# Patient Record
Sex: FEMALE | Race: BLACK OR AFRICAN AMERICAN | Employment: FULL TIME | URBAN - METROPOLITAN AREA
[De-identification: names, ages, dates, MRNs, and addresses within clinical notes are randomized per-mention and may not be internally consistent; named-entity substitution may affect disease eponyms.]

---

## 2020-12-26 ENCOUNTER — HOSPITAL ENCOUNTER (EMERGENCY)
Age: 35
Discharge: HOME | End: 2020-12-26
Attending: EMERGENCY MEDICINE

## 2020-12-26 VITALS
SYSTOLIC BLOOD PRESSURE: 125 MMHG | HEART RATE: 105 BPM | RESPIRATION RATE: 18 BRPM | DIASTOLIC BLOOD PRESSURE: 88 MMHG | OXYGEN SATURATION: 97 % | TEMPERATURE: 98.7 F

## 2020-12-26 DIAGNOSIS — H65.21 RIGHT CHRONIC SEROUS OTITIS MEDIA: Primary | ICD-10-CM

## 2020-12-26 PROCEDURE — 99282 EMERGENCY DEPT VISIT SF MDM: CPT

## 2020-12-26 RX ORDER — AMOXICILLIN 250 MG/1
250 CAPSULE ORAL 3 TIMES DAILY
COMMUNITY

## 2020-12-27 NOTE — DISCHARGE INSTRUCTIONS
Augmentin as directed. OTC sudafed 12 hour twice daily for 5 days. Follow up with ENT in 7-10 days if not improving.

## 2020-12-27 NOTE — ED TRIAGE NOTES
Patient was seen at [facility] couple weeks ago and she took antibiotic and it still there. Patient ishere because she think the infection is still there.  Patient was masked on arrival.

## 2020-12-27 NOTE — ED PROVIDER NOTES
The history is provided by the patient. Ear Pain   This is a recurrent problem. The current episode started more than 1 week ago. The problem occurs constantly. The problem has not changed since onset. There has been no fever. The pain is moderate. Associated symptoms include hearing loss. Pertinent negatives include no ear discharge and no rhinorrhea. History reviewed. No pertinent past medical history. History reviewed. No pertinent surgical history. History reviewed. No pertinent family history.     Social History     Socioeconomic History    Marital status: SINGLE     Spouse name: Not on file    Number of children: Not on file    Years of education: Not on file    Highest education level: Not on file   Occupational History    Not on file   Social Needs    Financial resource strain: Not on file    Food insecurity     Worry: Not on file     Inability: Not on file    Transportation needs     Medical: Not on file     Non-medical: Not on file   Tobacco Use    Smoking status: Never Smoker    Smokeless tobacco: Never Used   Substance and Sexual Activity    Alcohol use: Not on file    Drug use: Not on file    Sexual activity: Not on file   Lifestyle    Physical activity     Days per week: Not on file     Minutes per session: Not on file    Stress: Not on file   Relationships    Social connections     Talks on phone: Not on file     Gets together: Not on file     Attends Mormonism service: Not on file     Active member of club or organization: Not on file     Attends meetings of clubs or organizations: Not on file     Relationship status: Not on file    Intimate partner violence     Fear of current or ex partner: Not on file     Emotionally abused: Not on file     Physically abused: Not on file     Forced sexual activity: Not on file   Other Topics Concern    Not on file   Social History Narrative    Not on file         ALLERGIES: Aspirin and Codeine    Review of Systems Constitutional: Negative for fever. HENT: Positive for ear pain, hearing loss and tinnitus. Negative for ear discharge and rhinorrhea. Vitals:    12/26/20 2219   BP: 125/88   Pulse: (!) 105   Resp: 18   Temp: 98.7 °F (37.1 °C)   SpO2: 97%            Physical Exam  Vitals signs and nursing note reviewed. Constitutional:       General: She is not in acute distress. HENT:      Right Ear: Ear canal and external ear normal. A middle ear effusion is present. Tympanic membrane is bulging. Tympanic membrane is not erythematous. Left Ear: Ear canal and external ear normal. A middle ear effusion is present. Tympanic membrane is not erythematous or bulging. Neurological:      Mental Status: She is alert. MDM  Number of Diagnoses or Management Options  Diagnosis management comments: Serous otitis media non responsive to decongestants and antihistamines. Did respond to antibiotics at first in September. Augmentin and refer to ENT.          Procedures

## 2020-12-27 NOTE — ED NOTES
I have reviewed discharge instructions with the patient. The patient verbalized understanding. Patient left ED via Discharge Method: ambulatory to Home with self). Opportunity for questions and clarification provided. Patient given 1 scripts. To continue your aftercare when you leave the hospital, you may receive an automated call from our care team to check in on how you are doing. This is a free service and part of our promise to provide the best care and service to meet your aftercare needs.  If you have questions, or wish to unsubscribe from this service please call [phone]. Thank you for Choosing our [facility] Emergency Department.

## 2021-02-05 ENCOUNTER — HOSPITAL ENCOUNTER (EMERGENCY)
Age: 36
Discharge: HOME | End: 2021-02-05
Attending: EMERGENCY MEDICINE

## 2021-02-05 ENCOUNTER — APPOINTMENT (OUTPATIENT)
Dept: GENERAL RADIOLOGY | Age: 36
End: 2021-02-05
Attending: EMERGENCY MEDICINE

## 2021-02-05 VITALS
HEIGHT: 67 IN | DIASTOLIC BLOOD PRESSURE: 100 MMHG | BODY MASS INDEX: 32.96 KG/M2 | OXYGEN SATURATION: 98 % | SYSTOLIC BLOOD PRESSURE: 147 MMHG | WEIGHT: 210 LBS | TEMPERATURE: 98.1 F | RESPIRATION RATE: 19 BRPM | HEART RATE: 106 BPM

## 2021-02-05 DIAGNOSIS — M25.511 ACUTE PAIN OF RIGHT SHOULDER: ICD-10-CM
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
## 2021-02-05 LAB — HCG UR QL: NEGATIVE

## 2021-02-05 PROCEDURE — 73030 X-RAY EXAM OF SHOULDER: CPT

## 2021-02-05 PROCEDURE — 72040 X-RAY EXAM NECK SPINE 2-3 VW: CPT

## 2021-02-05 PROCEDURE — 99283 EMERGENCY DEPT VISIT LOW MDM: CPT

## 2021-02-05 PROCEDURE — 81025 URINE PREGNANCY TEST: CPT

## 2021-02-05 PROCEDURE — 74011250637 HC RX REV CODE- 250/637: Performed by: EMERGENCY MEDICINE

## 2021-02-05 RX ORDER — METHOCARBAMOL 500 MG/1
750 TABLET, FILM COATED ORAL
Status: COMPLETED | OUTPATIENT
Start: 2021-02-05 | End: 2021-02-05

## 2021-02-05 RX ORDER — METHOCARBAMOL 750 MG/1
750 TABLET, FILM COATED ORAL 3 TIMES DAILY
Qty: 15 TAB | Refills: 0 | OUTPATIENT
Start: 2021-02-05 | End: 2021-02-06

## 2021-02-05 RX ADMIN — METHOCARBAMOL TABLETS 750 MG: 500 TABLET, COATED ORAL at 23:06

## 2021-02-06 ENCOUNTER — HOSPITAL ENCOUNTER (EMERGENCY)
Age: 36
Discharge: HOME | End: 2021-02-06
Attending: EMERGENCY MEDICINE

## 2021-02-06 ENCOUNTER — APPOINTMENT (OUTPATIENT)
Dept: CT IMAGING | Age: 36
End: 2021-02-06
Attending: EMERGENCY MEDICINE

## 2021-02-06 VITALS
OXYGEN SATURATION: 98 % | HEIGHT: 67 IN | TEMPERATURE: 98.9 F | WEIGHT: 210 LBS | SYSTOLIC BLOOD PRESSURE: 159 MMHG | BODY MASS INDEX: 32.96 KG/M2 | RESPIRATION RATE: 18 BRPM | HEART RATE: 109 BPM | DIASTOLIC BLOOD PRESSURE: 84 MMHG

## 2021-02-06 DIAGNOSIS — R51.9 ACUTE NONINTRACTABLE HEADACHE, UNSPECIFIED HEADACHE TYPE: ICD-10-CM

## 2021-02-06 DIAGNOSIS — M79.18 MUSCULOSKELETAL PAIN: Primary | ICD-10-CM

## 2021-02-06 LAB
ALBUMIN SERPL-MCNC: 4.3 G/DL (ref 3.5–5)
ALBUMIN/GLOB SERPL: 1.1 {RATIO} (ref 1.2–3.5)
ALP SERPL-CCNC: 51 U/L (ref 50–136)
ALT SERPL-CCNC: 36 U/L (ref 12–65)
ANION GAP SERPL CALC-SCNC: 7 MMOL/L (ref 7–16)
APAP SERPL-MCNC: <2 UG/ML (ref 10–30)
AST SERPL-CCNC: 21 U/L (ref 15–37)
BILIRUB SERPL-MCNC: 0.2 MG/DL (ref 0.2–1.1)
BUN SERPL-MCNC: 9 MG/DL (ref 6–23)
CALCIUM SERPL-MCNC: 9.4 MG/DL (ref 8.3–10.4)
CHLORIDE SERPL-SCNC: 106 MMOL/L (ref 98–107)
CO2 SERPL-SCNC: 26 MMOL/L (ref 21–32)
CREAT SERPL-MCNC: 0.69 MG/DL (ref 0.6–1)
ERYTHROCYTE [DISTWIDTH] IN BLOOD BY AUTOMATED COUNT: 12 % (ref 11.9–14.6)
GLOBULIN SER CALC-MCNC: 3.9 G/DL (ref 2.3–3.5)
GLUCOSE SERPL-MCNC: 111 MG/DL (ref 65–100)
HCT VFR BLD AUTO: 37.5 % (ref 35.8–46.3)
HGB BLD-MCNC: 12.5 G/DL (ref 11.7–15.4)
MCH RBC QN AUTO: 31.6 PG (ref 26.1–32.9)
MCHC RBC AUTO-ENTMCNC: 33.3 G/DL (ref 31.4–35)
MCV RBC AUTO: 94.9 FL (ref 79.6–97.8)
NRBC # BLD: 0 K/UL (ref 0–0.2)
PLATELET # BLD AUTO: 462 K/UL (ref 150–450)
PMV BLD AUTO: 8.5 FL (ref 9.4–12.3)
POTASSIUM SERPL-SCNC: 3.4 MMOL/L (ref 3.5–5.1)
PROT SERPL-MCNC: 8.2 G/DL (ref 6.3–8.2)
RBC # BLD AUTO: 3.95 M/UL (ref 4.05–5.2)
SODIUM SERPL-SCNC: 139 MMOL/L (ref 136–145)
WBC # BLD AUTO: 9.6 K/UL (ref 4.3–11.1)

## 2021-02-06 PROCEDURE — 80143 DRUG ASSAY ACETAMINOPHEN: CPT

## 2021-02-06 PROCEDURE — 96372 THER/PROPH/DIAG INJ SC/IM: CPT

## 2021-02-06 PROCEDURE — 74011250636 HC RX REV CODE- 250/636: Performed by: EMERGENCY MEDICINE

## 2021-02-06 PROCEDURE — 99284 EMERGENCY DEPT VISIT MOD MDM: CPT

## 2021-02-06 PROCEDURE — 80053 COMPREHEN METABOLIC PANEL: CPT

## 2021-02-06 PROCEDURE — 85027 COMPLETE CBC AUTOMATED: CPT

## 2021-02-06 PROCEDURE — 70450 CT HEAD/BRAIN W/O DYE: CPT

## 2021-02-06 RX ORDER — KETOROLAC TROMETHAMINE 30 MG/ML
30 INJECTION, SOLUTION INTRAMUSCULAR; INTRAVENOUS ONCE
Status: COMPLETED | OUTPATIENT
Start: 2021-02-06 | End: 2021-02-06

## 2021-02-06 RX ORDER — CYCLOBENZAPRINE HCL 10 MG
10 TABLET ORAL
Qty: 15 TAB | Refills: 0 | Status: SHIPPED | OUTPATIENT
Start: 2021-02-06

## 2021-02-06 RX ORDER — DICLOFENAC SODIUM 50 MG/1
50 TABLET, DELAYED RELEASE ORAL 2 TIMES DAILY
Qty: 14 TAB | Refills: 0 | Status: SHIPPED | OUTPATIENT
Start: 2021-02-06 | End: 2021-02-13

## 2021-02-06 RX ADMIN — KETOROLAC TROMETHAMINE 30 MG: 30 INJECTION, SOLUTION INTRAMUSCULAR at 21:50

## 2021-02-06 NOTE — DISCHARGE INSTRUCTIONS
Take Robaxin as prescribed. Schedule close follow-up with your PCP. Return to ED if symptoms worsen or progress in any way. Rest, ICE, elevate.
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
## 2021-02-06 NOTE — ED NOTES
I have reviewed discharge instructions with the patient. The patient verbalized understanding. Patient left ED via Discharge Method: ambulatory to Home. Opportunity for questions and clarification provided. Patient given 1 scripts. Sling applied to right arm. To continue your aftercare when you leave the hospital, you may receive an automated call from our care team to check in on how you are doing. This is a free service and part of our promise to provide the best care and service to meet your aftercare needs.  If you have questions, or wish to unsubscribe from this service please call [phone]. Thank you for Choosing our [address] [place] [address] Emergency Department.
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
## 2021-02-06 NOTE — Clinical Note
[facility] EMERGENCY DEPT 
[address] [facility] 
[phone] Work/School Note Date: 2/6/2021 To Whom It May concern: 
 
Renetta Avila was seen and treated today in the emergency room by the following provider(s): 
Attending Provider: Charles Dugan MD. Renetta Avila is excused from work/school on 02/06/21 and 02/07/21. She is medically clear to return to work/school on 2/8/2021. Sincerely, Noreen Marin MD
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
## 2021-02-07 NOTE — ED NOTES
I have reviewed discharge instructions with the patient. The patient verbalized understanding. Patient left ED via Discharge Method: ambulatory to Home with self. Opportunity for questions and clarification provided. Patient given 2 scripts. To continue your aftercare when you leave the hospital, you may receive an automated call from our care team to check in on how you are doing. This is a free service and part of our promise to provide the best care and service to meet your aftercare needs.  If you have questions, or wish to unsubscribe from this service please call [phone]. Thank you for Choosing our [facility] Emergency Department.
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
## 2021-07-08 ENCOUNTER — HOSPITAL ENCOUNTER (OUTPATIENT)
Dept: CT IMAGING | Age: 36
Discharge: HOME | End: 2021-07-08
Attending: PHYSICIAN ASSISTANT

## 2021-07-08 DIAGNOSIS — H90.A31 MIXED CONDUCTIVE AND SENSORINEURAL HEARING LOSS OF RIGHT EAR WITH RESTRICTED HEARING OF LEFT EAR: ICD-10-CM

## 2021-07-08 DIAGNOSIS — H93.A1 PULSATILE TINNITUS, RIGHT EAR: ICD-10-CM

## 2021-07-08 PROCEDURE — 74011000636 HC RX REV CODE- 636: Performed by: PHYSICIAN ASSISTANT

## 2021-07-08 PROCEDURE — 70480 CT ORBIT/EAR/FOSSA W/O DYE: CPT

## 2021-07-08 PROCEDURE — 70491 CT SOFT TISSUE NECK W/DYE: CPT

## 2021-07-08 PROCEDURE — 74011000258 HC RX REV CODE- 258: Performed by: PHYSICIAN ASSISTANT

## 2021-07-08 RX ORDER — SODIUM CHLORIDE 0.9 % (FLUSH) 0.9 %
10 SYRINGE (ML) INJECTION
Status: COMPLETED | OUTPATIENT
Start: 2021-07-08 | End: 2021-07-08

## 2021-07-08 RX ADMIN — SODIUM CHLORIDE 100 ML: 900 INJECTION, SOLUTION INTRAVENOUS at 08:56

## 2021-07-08 RX ADMIN — IOPAMIDOL 80 ML: 755 INJECTION, SOLUTION INTRAVENOUS at 08:56

## 2021-07-08 RX ADMIN — Medication 10 ML: at 08:56

## 2022-09-24 ENCOUNTER — HOSPITAL ENCOUNTER (EMERGENCY)
Age: 37
Discharge: HOME | End: 2022-09-24
Attending: EMERGENCY MEDICINE

## 2022-09-24 ENCOUNTER — HOSPITAL ENCOUNTER (EMERGENCY)
Dept: CT IMAGING | Age: 37
Discharge: HOME | End: 2022-09-27

## 2022-09-24 VITALS
BODY MASS INDEX: 24.64 KG/M2 | WEIGHT: 157 LBS | HEART RATE: 92 BPM | HEIGHT: 67 IN | SYSTOLIC BLOOD PRESSURE: 131 MMHG | OXYGEN SATURATION: 99 % | RESPIRATION RATE: 16 BRPM | TEMPERATURE: 99.6 F | DIASTOLIC BLOOD PRESSURE: 101 MMHG

## 2022-09-24 DIAGNOSIS — R13.10 ODYNOPHAGIA: Primary | ICD-10-CM

## 2022-09-24 LAB
ALBUMIN SERPL-MCNC: 3.9 G/DL (ref 3.5–5)
ALBUMIN/GLOB SERPL: 1 {RATIO} (ref 1.2–3.5)
ALP SERPL-CCNC: 49 U/L (ref 50–136)
ALT SERPL-CCNC: 17 U/L (ref 12–65)
ANION GAP SERPL CALC-SCNC: 3 MMOL/L (ref 4–13)
AST SERPL-CCNC: 7 U/L (ref 15–37)
BASOPHILS # BLD: 0.1 K/UL (ref 0–0.2)
BASOPHILS NFR BLD: 1 % (ref 0–2)
BILIRUB SERPL-MCNC: 0.4 MG/DL (ref 0.2–1.1)
BUN SERPL-MCNC: 10 MG/DL (ref 6–23)
CALCIUM SERPL-MCNC: 9 MG/DL (ref 8.3–10.4)
CHLORIDE SERPL-SCNC: 112 MMOL/L (ref 101–110)
CO2 SERPL-SCNC: 24 MMOL/L (ref 21–32)
CREAT SERPL-MCNC: 0.7 MG/DL (ref 0.6–1)
CRP SERPL-MCNC: 0.5 MG/DL (ref 0–0.9)
DIFFERENTIAL METHOD BLD: ABNORMAL
EOSINOPHIL # BLD: 0.1 K/UL (ref 0–0.8)
EOSINOPHIL NFR BLD: 1 % (ref 0.5–7.8)
ERYTHROCYTE [DISTWIDTH] IN BLOOD BY AUTOMATED COUNT: 12 % (ref 11.9–14.6)
ERYTHROCYTE [SEDIMENTATION RATE] IN BLOOD: 19 MM/HR (ref 0–20)
GLOBULIN SER CALC-MCNC: 3.8 G/DL (ref 2.3–3.5)
GLUCOSE SERPL-MCNC: 83 MG/DL (ref 65–100)
HCT VFR BLD AUTO: 34.6 % (ref 35.8–46.3)
HGB BLD-MCNC: 11.5 G/DL (ref 11.7–15.4)
IMM GRANULOCYTES # BLD AUTO: 0 K/UL (ref 0–0.5)
IMM GRANULOCYTES NFR BLD AUTO: 0 % (ref 0–5)
LYMPHOCYTES # BLD: 2.1 K/UL (ref 0.5–4.6)
LYMPHOCYTES NFR BLD: 27 % (ref 13–44)
MCH RBC QN AUTO: 30 PG (ref 26.1–32.9)
MCHC RBC AUTO-ENTMCNC: 33.2 G/DL (ref 31.4–35)
MCV RBC AUTO: 90.3 FL (ref 79.6–97.8)
MONOCYTES # BLD: 0.5 K/UL (ref 0.1–1.3)
MONOCYTES NFR BLD: 6 % (ref 4–12)
NEUTS SEG # BLD: 5.2 K/UL (ref 1.7–8.2)
NEUTS SEG NFR BLD: 65 % (ref 43–78)
NRBC # BLD: 0 K/UL (ref 0–0.2)
PLATELET # BLD AUTO: 394 K/UL (ref 150–450)
PMV BLD AUTO: 9.2 FL (ref 9.4–12.3)
POTASSIUM SERPL-SCNC: 3.7 MMOL/L (ref 3.5–5.1)
PROT SERPL-MCNC: 7.7 G/DL (ref 6.3–8.2)
RBC # BLD AUTO: 3.83 M/UL (ref 4.05–5.2)
SODIUM SERPL-SCNC: 139 MMOL/L (ref 136–145)
WBC # BLD AUTO: 7.9 K/UL (ref 4.3–11.1)

## 2022-09-24 PROCEDURE — 6360000004 HC RX CONTRAST MEDICATION

## 2022-09-24 PROCEDURE — 85025 COMPLETE CBC W/AUTO DIFF WBC: CPT

## 2022-09-24 PROCEDURE — 86140 C-REACTIVE PROTEIN: CPT

## 2022-09-24 PROCEDURE — 96374 THER/PROPH/DIAG INJ IV PUSH: CPT

## 2022-09-24 PROCEDURE — 70498 CT ANGIOGRAPHY NECK: CPT

## 2022-09-24 PROCEDURE — 80053 COMPREHEN METABOLIC PANEL: CPT

## 2022-09-24 PROCEDURE — 99284 EMERGENCY DEPT VISIT MOD MDM: CPT

## 2022-09-24 PROCEDURE — 85652 RBC SED RATE AUTOMATED: CPT

## 2022-09-24 PROCEDURE — 6360000002 HC RX W HCPCS: Performed by: STUDENT IN AN ORGANIZED HEALTH CARE EDUCATION/TRAINING PROGRAM

## 2022-09-24 RX ORDER — KETOROLAC TROMETHAMINE 30 MG/ML
15 INJECTION, SOLUTION INTRAMUSCULAR; INTRAVENOUS ONCE
Status: COMPLETED | OUTPATIENT
Start: 2022-09-24 | End: 2022-09-24

## 2022-09-24 RX ORDER — NAPROXEN 500 MG/1
500 TABLET ORAL 2 TIMES DAILY
Qty: 60 TABLET | Refills: 0 | Status: SHIPPED | OUTPATIENT
Start: 2022-09-24 | End: 2022-10-24

## 2022-09-24 RX ADMIN — KETOROLAC TROMETHAMINE 15 MG: 30 INJECTION, SOLUTION INTRAMUSCULAR at 16:39

## 2022-09-24 RX ADMIN — IOPAMIDOL 100 ML: 755 INJECTION, SOLUTION INTRAVENOUS at 15:09

## 2022-09-24 ASSESSMENT — PAIN - FUNCTIONAL ASSESSMENT: PAIN_FUNCTIONAL_ASSESSMENT: NONE - DENIES PAIN

## 2022-09-24 ASSESSMENT — ENCOUNTER SYMPTOMS
CHEST TIGHTNESS: 0
SHORTNESS OF BREATH: 0
TROUBLE SWALLOWING: 1
BLOOD IN STOOL: 0
PHOTOPHOBIA: 0
VOICE CHANGE: 0
CONSTIPATION: 0
VOMITING: 0
EYE PAIN: 0
SORE THROAT: 0
WHEEZING: 0
DIARRHEA: 0
EYE REDNESS: 0
SINUS PRESSURE: 0
COLOR CHANGE: 0
ABDOMINAL PAIN: 0
APNEA: 0
NAUSEA: 0
EYE DISCHARGE: 0
COUGH: 0
RHINORRHEA: 0

## 2022-09-24 NOTE — ED PROVIDER NOTES
Emergency Department Provider Note                   PCP:                Unknown Unknown               Age: 40 y.o. Sex: female       ICD-10-CM    1. Odynophagia  R13.10           DISPOSITION Decision To Discharge 09/24/2022 05:30:45 PM        MDM  Number of Diagnoses or Management Options  Odynophagia  Diagnosis management comments: In summary this is a 54-year-old female patient presenting with symptoms of right neck pain and pain with swallowing. Based on my evaluation I feel she is low risk for more serious cause of diagnosis such as vessel occlusion, tumor, inability to swallow, airway compromise. Reasoning behind decision making process is that she is grossly well-appearing on exam.  She is handling her secretions well. She is able to swallow and she is able to pass swallow test with water without difficulty. She has even and nonlabored respirations and shows no sign of struggle to breathe. Her airway is intact. There is no signs of peritonsillar abscess. Uvula is midline. Reviewed chart from prior visit at [facility] 2 days ago show stable patient with a CT soft tissue neck with the following impression:     1. Dominant right internal jugular vein with slight asymmetric resultant bulging of the right neck. 2. Otherwise, no evidence of suspicious deep neck space mass. The visualized aerodigestive tract is symmetric and unremarkable. Today patient underwent repeat blood work which was largely unremarkable. She also had a CTA of her neck which was largely unremarkable. Given the fact that the patient is able to swallow without difficulty and has no other concerning signs on exam and has stable vitals, she will be discharged with GI follow-up. I have given a new GI referral so that hopefully she will be able to get in sooner. I have counseled her on warning signs return immediately for including but not limited to inability to swallow, difficulty handling secretions, difficulty breathing. Patient has verbalized understanding and was discharged in stable condition. She agreed with the plan. Patient history, ROS, physical exam, labs, radiological workup and all pertinent findings were discussed with attending Rachna Stroud MD.           Amount and/or Complexity of Data Reviewed  Clinical lab tests: ordered and reviewed  Tests in the radiology section of CPT®: ordered and reviewed  Discuss the patient with other providers: yes  Independent visualization of images, tracings, or specimens: yes    Risk of Complications, Morbidity, and/or Mortality  Presenting problems: moderate  Diagnostic procedures: moderate  Management options: moderate    Patient Progress  Patient progress: stable       ED Course as of 09/24/22 1734   Sat Sep 24, 2022   1330 Patient with swelling and pain to the right side of her neck that is worsening. She was at [facility] on Thursday for the same and they found an enlarged jugular vein in her neck with a possible mass. She was referred to GI. There is palpable tenderness and swelling to the right jugular vein. Patient evaluated initially in triage. Rapid Medical Evaluation was conducted and necessary orders have been placed. I have performed a medical screening exam.  Care will now be transferred to the provider in the back of the emergency department. STACIE Nichols PA 1:31 PM   [SM]   6207 5:33 PM  Ct unremarkable. Patient reports toradol helped greatly.  She was very thankful and agreeable of plan to discharge with GI followup and naproxen to go home on.  [NR]      ED Course User Index  [NR] Dahiana Peterson  [SM] STACIE Louise        Orders Placed This Encounter   Procedures    CTA NECK W WO CONTRAST    CMP    Sedimentation Rate    CBC with Auto Differential    C-Reactive Protein    Saline lock IV        Medications   ketorolac (TORADOL) injection 15 mg (15 mg IntraVENous Given 9/24/22 1639)       New Prescriptions    NAPROXEN (NAPROSYN) 500 MG TABLET    Take 1 tablet by mouth 2 times daily        Ramírez Hopkins is a 40 y.o. female who presents to the Emergency Department with chief complaint of    Chief Complaint   Patient presents with    Neck Pain      80-year-old female patient presents today complaining of right-sided neck pain and pulsatile tinnitus that has been ongoing for over a year. She reports she has been seen many times for this condition where it was initially thought she was having ear infections. 2 days ago she was seen at [facility] where she had a CT scan of her neck where it was found that her right jugular vein was enlarged. She was discharged with GI follow-up. She states she is still having the same symptoms and has not been able to see the GI doctor yet so she came here. Still complaining of pain in her right neck and pain when swallowing. She reports she is unable to swallow solids and can only swallow liquids. 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 Denies fever, chills, chest pain, trouble breathing. Denies pain down her arms. States she has full range of motion of the neck but that it is painful. Denies all other symptoms today. Patient is primary historian and quality is reliable. The history is provided by the patient. No  was used. Review of Systems   Constitutional:  Negative for appetite change, chills, fatigue, fever and unexpected weight change. HENT:  Positive for tinnitus and trouble swallowing. Negative for congestion, ear pain, hearing loss, postnasal drip, rhinorrhea, sinus pressure, sore throat and voice change. Eyes:  Negative for photophobia, pain, discharge, redness and visual disturbance. Respiratory:  Negative for apnea, cough, chest tightness, shortness of breath and wheezing. Cardiovascular:  Negative for chest pain, palpitations and leg swelling.    Gastrointestinal:  Negative for abdominal pain, blood in stool, constipation, diarrhea, nausea and vomiting. Endocrine: Negative for polydipsia, polyphagia and polyuria. Genitourinary:  Negative for decreased urine volume, dysuria, flank pain, frequency and hematuria. Musculoskeletal:  Positive for neck pain. Negative for arthralgias, joint swelling, myalgias and neck stiffness. Skin:  Negative for color change, rash and wound. Neurological:  Negative for dizziness, syncope, speech difficulty, weakness, light-headedness and headaches. Hematological:  Negative for adenopathy. Does not bruise/bleed easily. Psychiatric/Behavioral:  Negative for confusion, self-injury, sleep disturbance and suicidal ideas. All other systems reviewed and are negative. History reviewed. No pertinent past medical history. History reviewed. No pertinent surgical history. History reviewed. No pertinent family history. Social History     Socioeconomic History    Marital status: Single     Spouse name: None    Number of children: None    Years of education: None    Highest education level: None   Tobacco Use    Smoking status: Never    Smokeless tobacco: Never         Aspirin and Codeine     Previous Medications    AMOXICILLIN (AMOXIL) 250 MG CAPSULE    Take 250 mg by mouth 3 times daily    CYCLOBENZAPRINE (FLEXERIL) 10 MG TABLET    Take 10 mg by mouth 3 times daily as needed        Vitals signs and nursing note reviewed. Patient Vitals for the past 4 hrs:   BP SpO2   09/24/22 1611 (!) 131/101 99 %   09/24/22 1556 (!) 115/92 100 %          Physical Exam  Vitals and nursing note reviewed. Constitutional:       General: She is not in acute distress. Appearance: Normal appearance. She is not ill-appearing, toxic-appearing or diaphoretic. HENT:      Head: Normocephalic and atraumatic. Right Ear: Tympanic membrane, ear canal and external ear normal. There is no impacted cerumen. Left Ear: Tympanic membrane, ear canal and external ear normal. There is no impacted cerumen.       Nose: Nose normal. No congestion or rhinorrhea. Mouth/Throat:      Mouth: Mucous membranes are moist.      Pharynx: Oropharynx is clear. No oropharyngeal exudate or posterior oropharyngeal erythema. Eyes:      General: No scleral icterus. Right eye: No discharge. Left eye: No discharge. Conjunctiva/sclera: Conjunctivae normal.   Neck:      Vascular: Normal carotid pulses. No carotid bruit or JVD. Trachea: Trachea and phonation normal. No tracheal deviation. Comments: Right side mildly swollen compared with the left side. Tender to palpation along right side. No color changes or signs of infection. Patient is able to move neck through full range of motion but notes pain while doing so. Trachea is midline. Cardiovascular:      Rate and Rhythm: Normal rate and regular rhythm. Pulses: Normal pulses. Heart sounds: Normal heart sounds. No murmur heard. Pulmonary:      Effort: Pulmonary effort is normal. No respiratory distress. Breath sounds: Normal breath sounds. No stridor. No wheezing, rhonchi or rales. Abdominal:      General: Abdomen is flat. Bowel sounds are normal.      Palpations: Abdomen is soft. Tenderness: There is no abdominal tenderness. There is no guarding or rebound. Musculoskeletal:         General: Normal range of motion. Cervical back: Normal range of motion and neck supple. Tenderness present. No rigidity. Pain with movement present. Right lower leg: No edema. Left lower leg: No edema. Lymphadenopathy:      Cervical: No cervical adenopathy. Skin:     General: Skin is warm and dry. Capillary Refill: Capillary refill takes less than 2 seconds. Coloration: Skin is not jaundiced. Neurological:      General: No focal deficit present. Mental Status: She is alert and oriented to person, place, and time. Mental status is at baseline.    Psychiatric:         Mood and Affect: Mood normal.         Behavior: Behavior normal.         Thought Content: Thought content normal.         Judgment: Judgment normal.        Procedures         CTA NECK W WO CONTRAST                             Voice dictation software was used during the making of this note. This software is not perfect and grammatical and other typographical errors may be present. This note has not been completely proofread for errors.      Devi Santiagoma  09/24/22 8251

## 2022-09-24 NOTE — DISCHARGE INSTRUCTIONS
Your CT today that we did was largely unremarkable. We will treat you with pain medication. We advise that you call the GI doctor to make an appointment soon as possible for further work-up. You must return if you are unable to swallow liquids and solids. Return for any new or worsening symptoms.